# Patient Record
Sex: MALE | Race: WHITE | NOT HISPANIC OR LATINO | URBAN - METROPOLITAN AREA
[De-identification: names, ages, dates, MRNs, and addresses within clinical notes are randomized per-mention and may not be internally consistent; named-entity substitution may affect disease eponyms.]

---

## 2021-05-15 ENCOUNTER — EMERGENCY (EMERGENCY)
Facility: HOSPITAL | Age: 51
LOS: 1 days | Discharge: HOME | End: 2021-05-15
Attending: EMERGENCY MEDICINE
Payer: SELF-PAY

## 2021-05-15 VITALS
DIASTOLIC BLOOD PRESSURE: 98 MMHG | OXYGEN SATURATION: 99 % | WEIGHT: 179.9 LBS | HEIGHT: 71 IN | SYSTOLIC BLOOD PRESSURE: 140 MMHG | HEART RATE: 92 BPM | RESPIRATION RATE: 18 BRPM | TEMPERATURE: 98 F

## 2021-05-15 PROCEDURE — 99053 MED SERV 10PM-8AM 24 HR FAC: CPT

## 2021-05-15 PROCEDURE — 99282 EMERGENCY DEPT VISIT SF MDM: CPT

## 2021-05-15 PROCEDURE — 99283 EMERGENCY DEPT VISIT LOW MDM: CPT

## 2021-05-15 RX ORDER — FAMOTIDINE 10 MG/ML
40 INJECTION INTRAVENOUS ONCE
Refills: 0 | Status: COMPLETED | OUTPATIENT
Start: 2021-05-15 | End: 2021-05-15

## 2021-05-15 RX ADMIN — FAMOTIDINE 40 MILLIGRAM(S): 10 INJECTION INTRAVENOUS at 05:41

## 2021-05-15 NOTE — ED PROVIDER NOTE - NS ED ROS FT
Constitutional: no fever, sweats, and no chills.  CV: no chest pain, no edema.  Resp: no cough, no dyspnea  GI: no abdominal pain, no nausea and no vomiting.  : no dysuria, no hematuria  MSK: no msk pain, no weakness  Skin: +lesion, and no rashes.  Neuro: no LOC, no headache, no sensory deficits, and no dizziness  ROS otherwise negative except as noted in HPI.

## 2021-05-15 NOTE — ED ADULT TRIAGE NOTE - CHIEF COMPLAINT QUOTE
patient states that his throat feels like it is closing, states opens and closes started 1 hour ago, patient in no distress at this time patient states that his throat feels like it is closing, states opens and closes started 1 hour ago, patient in no distress at this time, 2 weeks ago had Pfizer vaccine

## 2021-05-15 NOTE — ED PROVIDER NOTE - PHYSICAL EXAMINATION
General: well appearing, NAD  Head:  NC, AT  Eyes: EOMI, PERRLA, no scleral icterus  Nose: No rhinorrhea  Mouth: no lesions or obstruction  Cardiac: RRR, no m/r/g, no lower extremity edema  Respiratory: CTABL, no wheezes/rales/rhonchi, equal chest wall expansions  Abdomen: soft, ND, NT, no rebound tenderness, no guarding, nonperitonitic  MSK/Vascular: full ROM, distal pulses intact, soft compartments, warm extremities  Neuro: AAOx3, coordinated movement of all 4 extremities  Psych: calm, cooperative, normal affect   Skin: 0.5cm papule R breast, ttp. No other rashes or lesions.

## 2021-05-15 NOTE — ED PROVIDER NOTE - CLINICAL SUMMARY MEDICAL DECISION MAKING FREE TEXT BOX
49yo M w/no pmh, hasn't seen a doctor in years, presents w/ throat-closing sensation. 49yo M w/no pmh, hasn't seen a doctor in years, presents w/ throat-closing sensation, fogginess, improving, benign exam. Monitor, reassess.

## 2021-05-15 NOTE — ED PROVIDER NOTE - NSFOLLOWUPINSTRUCTIONS_ED_ALL_ED_FT
Allergies, Adult      An allergy is a condition in which the body's defense system (immune system) comes in contact with an allergen and reacts to it. An allergen is anything that causes an allergic reaction. Allergens cause the immune system to make proteins for fighting infections (antibodies). These antibodies cause cells to release chemicals called histamines that set off the symptoms of an allergic reaction.    Allergies often affect the nasal passages (allergic rhinitis), eyes (allergic conjunctivitis), skin (atopic dermatitis), and stomach. Allergies can be mild, moderate, or severe. They cannot spread from person to person. Allergies can develop at any age and may be outgrown.      What are the causes?  This condition is caused by allergens. Common allergens include:  •Outdoor allergens, such as pollen, car fumes, and mold.      •Indoor allergens, such as dust, smoke, mold, and pet dander.      •Other allergens, such as foods, medicines, scents, insect bites or stings, and other skin irritants.        What increases the risk?  You are more likely to develop this condition if you have:  •Family members with allergies.      •Family members who have any condition that may be caused by allergens, such as asthma. This may make you more likely to have other allergies.        What are the signs or symptoms?    Symptoms of this condition depend on the severity of the allergy.    Mild to moderate symptoms     •Runny nose, stuffy nose (nasal congestion), or sneezing.      •Itchy mouth, ears, or throat.      •A feeling of mucus dripping down the back of your throat (postnasal drip).      •Sore throat.      •Itchy, red, watery, or puffy eyes.      •Skin rash, or itchy, red, swollen areas of skin (hives).      •Stomach cramps or bloating.      Severe symptoms   Severe allergies to food, medicine, or insect bites may cause anaphylaxis, which can be life-threatening. Symptoms include:  •A red (flushed) face.      •Wheezing or coughing.      •Swollen lips, tongue, or mouth.      •Tight or swollen throat.      •Chest pain or tightness, or rapid heartbeat.      •Trouble breathing or shortness of breath.      •Pain in the abdomen, vomiting, or diarrhea.      •Dizziness or fainting.        How is this diagnosed?  This condition is diagnosed based on your symptoms, your family and medical history, and a physical exam. You may also have tests, including:•Skin tests to see how your skin reacts to allergens that may be causing your symptoms. Tests include:  •Skin prick test. For this test, an allergen is introduced to your body through a small opening in the skin.      •Intradermal skin test. For this test, a small amount of allergen is injected under the first layer of your skin.      •Patch test. For this test, a small amount of allergen is placed on your skin. The area is covered and then checked after a few days.        •Blood tests.      •A challenge test. For this test, you will eat or breathe in a small amount of allergen to see if you have an allergic reaction.    You may also be asked to:  •Keep a food diary. This is a record of all the foods, drinks, and symptoms you have in a day.    •Try an elimination diet. To do this:  •Remove certain foods from your diet.      •Add those foods back one by one to find out if any foods cause an allergic reaction.          How is this treated?                Treatment for allergies depends on your symptoms. Treatment may include:  •Cold, wet cloths (cold compresses) to soothe itching and swelling.      •Eye drops or nasal sprays.      •Nasal irrigation to help clear your mucus or keep the nasal passages moist.      •A humidifier to add moisture to the air.      •Skin creams to treat rashes or itching.      •Oral antihistamines or other medicines to block the reaction or to treat inflammation.      •Diet changes to remove foods that cause allergies.    •Being exposed again and again to tiny amounts of allergens to help you build a defense against it (tolerance). This is called immunotherapy. Examples include:  •Allergy shot. You receive an injection that contains an allergen.      •Sublingual immunotherapy. You take a small dose of allergen under your tongue.        •Emergency injection for anaphylaxis. You give yourself a shot using a syringe (auto-injector) that contains the amount of medicine you need. Your health care provider will teach you how to give yourself an injection.        Follow these instructions at home:      Medicines      •Take or apply over-the-counter and prescription medicines only as told by your health care provider.      •Always carry your auto-injector pen if you are at risk of anaphylaxis. Give yourself an injection as told by your health care provider.      Eating and drinking     •Follow instructions from your health care provider about eating or drinking restrictions.      •Drink enough fluid to keep your urine pale yellow.      General instructions     •Wear a medical alert bracelet or necklace to let others know that you have had anaphylaxis before.      •Avoid known allergens whenever possible.      •Keep all follow-up visits as told by your health care provider. This is important.        Contact a health care provider if:    •Your symptoms do not get better with treatment.        Get help right away if:  •You have symptoms of anaphylaxis. These include:  •Swollen mouth, tongue, or throat.      •Pain or tightness in your chest.      •Trouble breathing or shortness of breath.      •Dizziness or fainting.      •Severe abdominal pain, vomiting, or diarrhea.        These symptoms may represent a serious problem that is an emergency. Do not wait to see if the symptoms will go away. Get medical help right away. Call your local emergency services (911 in the [place] Do not drive yourself to the hospital.       Summary    •Take or apply over-the-counter and prescription medicines only as told by your health care provider.      •Avoid known allergens when possible.      •Always carry your auto-injector pen if you are at risk of anaphylaxis. Give yourself an injection as told by your health care provider.      •Wear a medical alert bracelet or necklace to let others know that you have had anaphylaxis before.      •Anaphylaxisis a life-threatening emergency. Get help right away.      This information is not intended to replace advice given to you by your health care provider. Make sure you discuss any questions you have with your health care provider.

## 2021-05-15 NOTE — ED PROVIDER NOTE - OBJECTIVE STATEMENT
51yo M w/no pmh, hasn't seen a doctor in years, presents w/ throat-closing sensation. Reports ~3am, sitting watching TV, felt sensation of throat opening and closing. Assoc/w feeling of grogginess and ear fullness, now resolved, as well as a singular tender papule on R breast. No other rashes or lesions. Denies CP, SOB, n/v, abdominal pain. Denies change to environment, detergent, recent cleaning, new foods. Tolerated PO water en route to ER. Allergic to Penicillin (hives). 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 Received COVID vaccine 2 weeks ago.

## 2021-05-15 NOTE — ED PROVIDER NOTE - ATTENDING CONTRIBUTION TO CARE
Subjective sensation of throat closing but objectively no angioedema or signs of airway compromise, no rash or other anaphylactic symptoms. Does report some worsening when laying flat, possibly having reflux? was given famotidine with resolution of symptoms. No chest pain or other anginal equivalent symptoms. Reassurance provided, will be going home with family for monitoring.